# Patient Record
Sex: FEMALE | Race: WHITE | Employment: UNEMPLOYED
[De-identification: names, ages, dates, MRNs, and addresses within clinical notes are randomized per-mention and may not be internally consistent; named-entity substitution may affect disease eponyms.]

---

## 2023-04-13 ENCOUNTER — HOSPITAL ENCOUNTER (OUTPATIENT)
Facility: HOSPITAL | Age: 33
Setting detail: RECURRING SERIES
Discharge: HOME | End: 2023-04-16
Payer: OTHER GOVERNMENT

## 2023-04-13 PROCEDURE — 97161 PT EVAL LOW COMPLEX 20 MIN: CPT

## 2023-04-13 PROCEDURE — 97535 SELF CARE MNGMENT TRAINING: CPT

## 2023-04-13 PROCEDURE — 97110 THERAPEUTIC EXERCISES: CPT

## 2023-05-12 ENCOUNTER — TELEPHONE (OUTPATIENT)
Facility: HOSPITAL | Age: 33
End: 2023-05-12

## 2023-11-17 ENCOUNTER — HOSPITAL ENCOUNTER (OUTPATIENT)
Facility: HOSPITAL | Age: 33
Setting detail: RECURRING SERIES
Discharge: HOME | End: 2023-11-20
Payer: OTHER GOVERNMENT

## 2023-11-17 PROCEDURE — 97535 SELF CARE MNGMENT TRAINING: CPT

## 2023-11-17 PROCEDURE — 97112 NEUROMUSCULAR REEDUCATION: CPT

## 2023-11-17 PROCEDURE — 97110 THERAPEUTIC EXERCISES: CPT

## 2023-11-17 PROCEDURE — 97162 PT EVAL MOD COMPLEX 30 MIN: CPT

## 2023-11-17 NOTE — PROGRESS NOTES
evidenced by a 5 point  improvement in FOTO score. Eval: FOTO 58  FOTO score=established functional score where 100=no disability     Pt demonstrates independence with management tools & exercise program that are beneficial for current condition in order to feel comfortable with Pelvic floor PT D/C & not fear. Eval: pt unaware of what activities to do reduce her symptoms and to avoid exacerbation of current condition     Frequency / Duration: Patient to be seen 1 times per week for 12 treatments    Patient/ Caregiver education and instruction: Diagnosis, prognosis, self care, activity modification, and exercises     [x]  Plan of care has been reviewed with PTA    Certification Period: francisco Monroy, PT 11/17/2023 11:54 AM    ________________________________________________________________________    I certify that the above Therapy Services are being furnished while the patient is under my care. I agree with the treatment plan and certify that this therapy is necessary.     Physician's Signature:____________________  Date:____________Time:____________                                      Kasandra Houston      Please sign and return to [facility] [place] [address] [address], [address] [place] [address]  Ph [phone]  Fx [phone]
analyze and modify body mechanics/ergonomics, and assess and modify postural abnormalities to attain remaining goals. [x]  See Plan of Care  []  See progress note/recertification  []  See Discharge Summary         Progress towards goals / Updated goals:  Short term goals: To be achieved in 6 treatments[de-identified]  Pt demonstrates proper dietary/fluid habits & urge suppression strategies that promote bladder & bowel health to aid in management of urinary urgency & incontinence. Eval: Pt consuming slightly insufficient amount of water & unaware of bladder fitness, dietary irritants & urge suppression strategies. Pt reports improvement in UI complaints evidenced by decrease in frequency &/or amount of leakage by 50% to improve QOL. Eval: Pt reports 2-3 episodes each week, generally drop or two to damp (amt of leakage). Pt will report voiding interval of 3 hours to improve QOL  Eval:   2 hours    Pt will report bowel movements as  3-4 on the [facility] Stool Scale to improve QOL  Eval: 1-3    Pt will report pain/pressure with intercourse at worst to be 5/10 to improve relations with her   Eval: 9/10 pressure especially towards the rectum    Pt will report lower abdominal pain at worst to be 5/10 to improve QOL  Eval: 9/10    Pt will be able to maintain [facility] for 8 sec, 8 reps with no accessory substitution or breath holding to improve strength to decrease reported symptoms. Eval: PFMC 3 sec, 7 reps with gluteal substitutions & breath holding    Long term goals: To be achieved in 12 treatments[de-identified]  Pt reports bladder continence 75% of the time with cough/sneeze/laugh   Eval: Pt reports 2-3 episodes each week, generally drop or two to damp (amt of leakage). Pt demonstrates [facility] 4/5 & ability to maintain contraction for 10 sec, 10 reps to improve strength to decrease reported symptoms.    Eval: PFM 4/5, 3 sec hold, 7 reps, with gluteal substitution    Pt will report bowel movements as  4 on the [facility] Stool

## 2023-11-30 ENCOUNTER — TELEPHONE (OUTPATIENT)
Facility: HOSPITAL | Age: 33
End: 2023-11-30

## 2023-11-30 ENCOUNTER — HOSPITAL ENCOUNTER (OUTPATIENT)
Facility: HOSPITAL | Age: 33
Setting detail: RECURRING SERIES
End: 2023-11-30
Payer: OTHER GOVERNMENT

## 2023-11-30 PROCEDURE — 97530 THERAPEUTIC ACTIVITIES: CPT

## 2023-11-30 PROCEDURE — 97112 NEUROMUSCULAR REEDUCATION: CPT

## 2023-11-30 PROCEDURE — 97535 SELF CARE MNGMENT TRAINING: CPT

## 2023-11-30 NOTE — PROGRESS NOTES
PHYSICAL / OCCUPATIONAL THERAPY - DAILY TREATMENT NOTE (updated )    Patient Name: Randy Lujan    Date: 2023    : 1990  Insurance: Payor:  EAST / Plan: [employer] EAST / Product Type: *No Product type* /      Patient  verified YES   Visit #   Current / Total 2 12   Time   In / Out 4:18 5:10   Pain   In / Out 3 2   Subjective Functional Status/Changes: Patient reports adherence to stretching HEP. Has been asking herself if she needs to go to help with urgency. Continues to have leakage and today didn't feel it. Changes to:  Meds, Allergies, Med Hx, Sx Hx? If yes, update Summary List YES/NO     TREATMENT AREA =  Other symptoms and signs involving the genitourinary system [R39.89]    OBJECTIVE           Therapeutic Procedures: Tx Min Billable or 1:1 Min (if diff from Tx Min) Procedure, Rationale, Specifics   28  [id number] Neuromuscular Re-Education (timed):  improve balance, coordination, kinesthetic sense, posture, core stability and proprioception to improve patient's ability to develop conscious control of individual muscles and awareness of position of extremities in order to progress to PLOF and address remaining functional goals. (see flow sheet as applicable)     Details if applicable:    []  Pelvic floor strengthening                 []  Pelvic floor downtraining  []  Quality pelvic floor contractions       []  Relaxation techniques  []  Urge suppression exercises  [x]  Other: abdominal massage, propre diaphragmatic breathing, PFM stretching   12  88455 Therapeutic Activity (timed):  use of dynamic activities replicating functional movements to increase ROM, strength, coordination, balance, and proprioception in order to improve patient's ability to progress to PLOF and address remaining functional goals.   (see flow sheet as applicable)     Details if applicable:    []  Pelvic floor strengthening                 []  Pelvic floor downtraining  []  Quality pelvic floor contractions

## 2023-12-07 ENCOUNTER — HOSPITAL ENCOUNTER (OUTPATIENT)
Facility: HOSPITAL | Age: 33
Setting detail: RECURRING SERIES
Discharge: HOME | End: 2023-12-10
Payer: OTHER GOVERNMENT

## 2023-12-07 PROCEDURE — 97535 SELF CARE MNGMENT TRAINING: CPT

## 2023-12-07 PROCEDURE — 97112 NEUROMUSCULAR REEDUCATION: CPT

## 2023-12-07 PROCEDURE — 97110 THERAPEUTIC EXERCISES: CPT

## 2023-12-07 NOTE — PROGRESS NOTES
PHYSICAL / OCCUPATIONAL THERAPY - DAILY TREATMENT NOTE (updated )    Patient Name: Marcelino Golden    Date: 2023    : 1990  Insurance: Payor:  EAST / Plan:  EAST / Product Type: *No Product type* /      Patient  verified Yes     Visit #   Current / Total 3 12   Time   In / Out 752 840   Pain   In / Out 3 3   Subjective Functional Status/Changes: Pt reports using urge suppression, ILU massage   Changes to: Allergies, Med Hx, Sx Hx?   no       TREATMENT AREA =  Other symptoms and signs involving the genitourinary system [R39.89]    OBJECTIVE          Therapeutic Procedures: Tx Min Billable or 1:1 Min (if diff from Tx Min) Procedure, Rationale, Specifics   32 32 [id number] Neuromuscular Re-Education (timed):  improve balance, coordination, kinesthetic sense, posture, core stability and proprioception to improve patient's ability to develop conscious control of individual muscles and awareness of position of extremities in order to progress to PLOF and address remaining functional goals. (see flow sheet as applicable)    Details if applicable:       8  36526 Therapeutic Exercise (timed):  increase ROM, strength, coordination, balance, and proprioception to improve patient's ability to progress to PLOF and address remaining functional goals. (see flow sheet as applicable)    Details if applicable:     8  20289 Self Care/Home Management (timed):  improve patient knowledge and understanding of pain reducing techniques and activity modification  to improve patient's ability to progress to PLOF and address remaining functional goals.   (see flow sheet as applicable)     Details if applicable:           Details if applicable:            Details if applicable:     48 50 [address] Reminder: bill using total billable min of TIMED therapeutic procedures (example: do not include dry needle or estim unattended, both untimed codes, in totals to left)  8-22 min = 1 unit; 23-37 min = 2 units; 38-52

## 2023-12-13 ENCOUNTER — TELEPHONE (OUTPATIENT)
Facility: HOSPITAL | Age: 33
End: 2023-12-13

## 2023-12-13 ENCOUNTER — HOSPITAL ENCOUNTER (OUTPATIENT)
Facility: HOSPITAL | Age: 33
Setting detail: RECURRING SERIES
End: 2023-12-13
Payer: OTHER GOVERNMENT

## 2023-12-13 NOTE — TELEPHONE ENCOUNTER
Pt called 12/12 to cxl f/u for 12/13 and 12/22 b/c child is sick. Call was not recorded and appts not cxl'd. Pt called back 12/13 to clarify.  Missed appt on 12/13 will NOT be counted as no-show

## 2023-12-14 ENCOUNTER — APPOINTMENT (OUTPATIENT)
Facility: HOSPITAL | Age: 33
End: 2023-12-14
Payer: OTHER GOVERNMENT

## 2023-12-21 ENCOUNTER — APPOINTMENT (OUTPATIENT)
Facility: HOSPITAL | Age: 33
End: 2023-12-21
Payer: OTHER GOVERNMENT

## 2023-12-22 ENCOUNTER — APPOINTMENT (OUTPATIENT)
Facility: HOSPITAL | Age: 33
End: 2023-12-22
Payer: OTHER GOVERNMENT

## 2023-12-28 ENCOUNTER — HOSPITAL ENCOUNTER (OUTPATIENT)
Facility: HOSPITAL | Age: 33
Setting detail: RECURRING SERIES
Discharge: HOME | End: 2023-12-31
Payer: OTHER GOVERNMENT

## 2023-12-28 PROCEDURE — 97110 THERAPEUTIC EXERCISES: CPT

## 2023-12-28 PROCEDURE — 97112 NEUROMUSCULAR REEDUCATION: CPT

## 2023-12-28 PROCEDURE — 97535 SELF CARE MNGMENT TRAINING: CPT

## 2023-12-28 NOTE — PROGRESS NOTES
leakage by 50% to improve QOL.  Eval: Pt reports 2-3 episodes each week, generally drop or two to damp (amt of leakage).               Current: 11/30/23: reports increased leakage today that she was unaware of- regression   12/28: patient reports a few drops a day - regression     Pt will report voiding interval of 3 hours to improve QOL  Eval:   2 hours     Pt will report bowel movements as  3-4 on the [place] Stool Scale to improve QOL  Eval: 1-3  Current:  1-4  on the bristol scale.  She used to have type 6 5x/day.  12/7/2023 Progressing     Pt will report pain/pressure with intercourse at worst to be 5/10 to improve relations with her   Eval: 9/10 pressure especially towards the rectum     Pt will report lower abdominal pain at worst to be 5/10 to improve QOL  Eval: 9/10  12/28: 6/10 at worse PROGRESSING     Pt will be able to maintain PFMC for 8 sec, 8 reps with no accessory substitution or breath holding to improve strength to decrease reported symptoms.  Eval: PFMC 3 sec, 7 reps with gluteal substitutions & breath holding     Long term goals: To be achieved in 12 treatments::  Pt reports bladder continence 75% of the time with cough/sneeze/laugh   Eval: Pt reports 2-3 episodes each week, generally drop or two to damp (amt of leakage).      Pt demonstrates PFMC 4/5 & ability to maintain contraction for 10 sec, 10 reps to improve strength to decrease reported symptoms.   Eval: PFM 4/5, 3 sec hold, 7 reps, with gluteal substitution     Pt will report bowel movements as  4 on the [place] Stool Scale to improve QOL  Eval: 1-3     Pt will report pain/pressure with intercourse at worst to be 2/10 to improve relations with her   Eval: 9/10 pressure especially towards the rectum     Pt will report lower abdominal pain at worst to be 2/10 to improve QOL and allow her to lie flat in supine  Eval: 9/10 especially while lying flat in supine     Pt will decrease SHUBHAM by  1    fingers width at umbilicus and 1\"

## 2024-01-04 ENCOUNTER — HOSPITAL ENCOUNTER (OUTPATIENT)
Facility: HOSPITAL | Age: 34
Setting detail: RECURRING SERIES
Discharge: HOME | End: 2024-01-07
Payer: OTHER GOVERNMENT

## 2024-01-04 PROCEDURE — 97140 MANUAL THERAPY 1/> REGIONS: CPT

## 2024-01-04 PROCEDURE — 97112 NEUROMUSCULAR REEDUCATION: CPT

## 2024-01-04 PROCEDURE — 97110 THERAPEUTIC EXERCISES: CPT

## 2024-01-04 NOTE — PROGRESS NOTES
PHYSICAL / OCCUPATIONAL THERAPY - DAILY TREATMENT NOTE (updated )    Patient Name: Ana Patrick    Date: 2024    : 1990  Insurance: Payor: [employer] EAST / Plan: [employer] EAST / Product Type: *No Product type* /      Patient  verified Yes     Visit #   Current / Total 5 12   Time   In / Out 752 835   Pain   In / Out 4 3   Subjective Functional Status/Changes: Pt reports feeling increased cramping this week.  Pt reported increased discomfort with TA activation with march   Changes to:  Allergies, Med Hx, Sx Hx?   no       TREATMENT AREA =  Other symptoms and signs involving the genitourinary system [R39.89]    OBJECTIVE          Therapeutic Procedures:  Tx Min Billable or 1:1 Min (if diff from Tx Min) Procedure, Rationale, Specifics   24  27402 Therapeutic Exercise (timed):  increase ROM, strength, coordination, balance, and proprioception to improve patient's ability to progress to PLOF and address remaining functional goals. (see flow sheet as applicable)    Details if applicable:        20475 Manual Therapy (timed):  decrease pain, increase ROM, and increase tissue extensibility to improve patient's ability to progress to PLOF and address remaining functional goals.  The manual therapy interventions were performed at a separate and distinct time from the therapeutic activities interventions . Details:  scar massage, MFR abdomen with fascial manipulation of bladder/uterus    Details if applicable:      83022 Neuromuscular Re-Education (timed):  improve balance, coordination, kinesthetic sense, posture, core stability and proprioception to improve patient's ability to develop conscious control of individual muscles and awareness of position of extremities in order to progress to PLOF and address remaining functional goals. (see flow sheet as applicable)     Details if applicable:           Details if applicable:            Details if applicable:     43 43  BC Totals Reminder:

## 2024-01-10 ENCOUNTER — HOSPITAL ENCOUNTER (OUTPATIENT)
Facility: HOSPITAL | Age: 34
Setting detail: RECURRING SERIES
Discharge: HOME | End: 2024-01-13
Payer: OTHER GOVERNMENT

## 2024-01-10 PROCEDURE — 97112 NEUROMUSCULAR REEDUCATION: CPT

## 2024-01-10 PROCEDURE — 97110 THERAPEUTIC EXERCISES: CPT

## 2024-01-10 PROCEDURE — 97530 THERAPEUTIC ACTIVITIES: CPT

## 2024-01-10 NOTE — PROGRESS NOTES
PHYSICAL / OCCUPATIONAL THERAPY - DAILY TREATMENT NOTE     Patient Name: Ana Patrick    Date: 1/10/2024    : 1990  Insurance: Payor:  EAST / Plan:  EAST / Product Type: *No Product type* /      Patient  verified Yes     Visit #   Current / Total 6 12   Time   In / Out 512 550   Pain   In / Out 3 3   Subjective Functional Status/Changes: Pt reports less pain   Changes to:  Allergies, Med Hx, Sx Hx?   no       TREATMENT AREA =  Other symptoms and signs involving the genitourinary system [R39.89]    OBJECTIVE          Therapeutic Procedures:  Tx Min Billable or 1:1 Min (if diff from Tx Min) Procedure, Rationale, Specifics    53425 Therapeutic Exercise (timed):  increase ROM, strength, coordination, balance, and proprioception to improve patient's ability to progress to PLOF and address remaining functional goals. (see flow sheet as applicable)    Details if applicable:       8  41137 Neuromuscular Re-Education (timed):  improve balance, coordination, kinesthetic sense, posture, core stability and proprioception to improve patient's ability to develop conscious control of individual muscles and awareness of position of extremities in order to progress to PLOF and address remaining functional goals. (see flow sheet as applicable)    Details if applicable:      69994 Therapeutic Activity (timed):  use of dynamic activities replicating functional movements to increase ROM, strength, coordination, balance, and proprioception in order to improve patient's ability to progress to PLOF and address remaining functional goals.  (see flow sheet as applicable)     Details if applicable:           Details if applicable:            Details if applicable:     38 38 [facility] Totals Reminder: bill using total billable min of TIMED therapeutic procedures (example: do not include dry needle or estim unattended, both untimed codes, in totals to left)  8-22 min = 1 unit; 23-37 min = 2 units; 38-52 min = 3

## 2024-01-10 NOTE — PROGRESS NOTES
[facility]  [address] [place] [address]  Ph [phone]  Fx [phone]    Physical Therapy Progress Note  Patient name: Ana Patrick Start of Care: 2023   Referral source: Grisel Nur : 1990               Medical Diagnosis: Other symptoms and signs involving the genitourinary system [R39.89]    Onset Date:2009               Treatment Diagnosis: Other symptoms and signs involving the genitourinary system [R39.89]   Prior Hospitalization: see medical history Provider#: [id number]   Medications: Verified on Patient summary List    Comorbidities:  PCOS, 1 pregnancy/ , fatty liver, intercranial hypertension,  UTI . IBS   Prior Level of Function: active lifestyle, no pain, no leakage    Visits from Start of Care: 6    Missed Visits: 0    Updated Goals/Measure of Progress: To be achieved in 6 treatments:    Short term goals: To be achieved in 6 treatments::  Pt demonstrates proper dietary/fluid habits & urge suppression strategies that promote bladder & bowel health to aid in management of urinary urgency & incontinence.  Eval: Pt consuming slightly insufficient amount of water & unaware of bladder fitness, dietary irritants & urge suppression strategies.               Current: 23: patient reports trying to wait when she has urge  PN: pt reports drinking about 100 ounces. Pt reports using urge suppression GOAL MET 1/10/2024     Pt reports improvement in UI complaints evidenced by decrease in frequency &/or amount of leakage by 50% to improve QOL.  Eval: Pt reports 2-3 episodes each week, generally drop or two to damp (amt of leakage).               Current: 23: reports increased leakage today that she was unaware of- regression              : patient reports a few drops a day - regression  PN: At least 1x/day.  A few drops   No Change  1/10/2024     Pt will report voiding interval of 3 hours to improve QOL  Eval:   2 hours  Current:

## 2024-01-11 ENCOUNTER — APPOINTMENT (OUTPATIENT)
Facility: HOSPITAL | Age: 34
End: 2024-01-11
Payer: OTHER GOVERNMENT

## 2024-01-18 ENCOUNTER — TELEPHONE (OUTPATIENT)
Facility: HOSPITAL | Age: 34
End: 2024-01-18

## 2024-01-25 ENCOUNTER — HOSPITAL ENCOUNTER (OUTPATIENT)
Facility: HOSPITAL | Age: 34
Setting detail: RECURRING SERIES
Discharge: HOME | End: 2024-01-28
Payer: OTHER GOVERNMENT

## 2024-01-25 PROCEDURE — 97530 THERAPEUTIC ACTIVITIES: CPT

## 2024-01-25 PROCEDURE — 97112 NEUROMUSCULAR REEDUCATION: CPT

## 2024-01-25 PROCEDURE — 97110 THERAPEUTIC EXERCISES: CPT

## 2024-01-25 NOTE — PROGRESS NOTES
dry needle or estim unattended, both untimed codes, in totals to left)  8-22 min = 1 unit; 23-37 min = 2 units; 38-52 min = 3 units; 53-67 min = 4 units; 68-82 min = 5 units   Total Total     TOTAL TREATMENT TIME:        40     [x]  Patient Education billed concurrently with other procedures   [x] Review HEP    [] Progressed/Changed HEP, detail:    [] Other detail:       Objective Information/Functional Measures/Assessment    Patient tolerated treatment session well today. Patient had no complaints with addition of transversus abdominis activation on foam roller march/leg extension/one arm fly  to exercise program to accomplish increased pelvic floor activation.   Patient continues to make steady progress toward goals and would benefit from continued skilled PT intervention to address remaining deficits outlined in goals below.     Patient will continue to benefit from skilled PT services to modify and progress therapeutic interventions, analyze and address functional mobility deficits, analyze and address ROM deficits, analyze and address strength deficits, analyze and address soft tissue restrictions, analyze and cue for proper movement patterns, and analyze and modify for postural abnormalities to address functional deficits and attain remaining goals.    Progress toward goals / Updated goals:  []  See Progress Note/Recertification    Short term goals: To be achieved in 6 treatments::  Pt demonstrates proper dietary/fluid habits & urge suppression strategies that promote bladder & bowel health to aid in management of urinary urgency & incontinence.  Eval: Pt consuming slightly insufficient amount of water & unaware of bladder fitness, dietary irritants & urge suppression strategies.               Current: 11/30/23: patient reports trying to wait when she has urge  PN: pt reports drinking about 100 ounces. Pt reports using urge suppression GOAL MET 1/10/2024     Pt reports improvement in UI complaints evidenced by

## 2024-02-01 ENCOUNTER — HOSPITAL ENCOUNTER (OUTPATIENT)
Facility: HOSPITAL | Age: 34
Setting detail: RECURRING SERIES
Discharge: HOME | End: 2024-02-04
Payer: OTHER GOVERNMENT

## 2024-02-01 PROCEDURE — 97112 NEUROMUSCULAR REEDUCATION: CPT

## 2024-02-01 PROCEDURE — 97140 MANUAL THERAPY 1/> REGIONS: CPT

## 2024-02-01 PROCEDURE — 97110 THERAPEUTIC EXERCISES: CPT

## 2024-02-01 PROCEDURE — 97530 THERAPEUTIC ACTIVITIES: CPT

## 2024-02-01 NOTE — PROGRESS NOTES
slightly insufficient amount of water & unaware of bladder fitness, dietary irritants & urge suppression strategies.               Current: 11/30/23: patient reports trying to wait when she has urge  PN: pt reports drinking about 100 ounces. Pt reports using urge suppression GOAL MET 1/10/2024     Pt reports improvement in UI complaints evidenced by decrease in frequency &/or amount of leakage by 50% to improve QOL.  Eval: Pt reports 2-3 episodes each week, generally drop or two to damp (amt of leakage).               Current: 11/30/23: reports increased leakage today that she was unaware of- regression              12/28: patient reports a few drops a day - regression  PN: At least 1x/day.  A few drops   No Change  1/10/2024     Pt will report voiding interval of 3 hours to improve QOL  Eval:   2 hours  Current:  2-3 hours Progressing 1/4/24  PN: 2-3 hours No change 1/10/2024     Pt will report bowel movements as  3-4 on the [place] Stool Scale to improve QOL  Eval: 1-3  Current:  1-4  on the bristol scale.  She used to have type 6 5x/day.  12/7/2023 Progressing  PN:fluctuates between 4-5 GOAL MET 1/10/2024     Pt will report pain/pressure with intercourse at worst to be 5/10 to improve relations with her   Eval: 9/10 pressure especially towards the rectum  PN: 4/10 pressure towards her rectum  GOAL MET 1/10/2024        Pt will report lower abdominal pain at worst to be 5/10 to improve QOL  Eval: 9/10  12/28: 6/10 at worse PROGRESSING  PN: 6/10 at worst No change 1/10/2024  Current:  pt reports having had one episode of pain that was a 9/10 but short.  2/1/2024 Regression     Pt will be able to maintain PFMC for 8 sec, 8 reps with no accessory substitution or breath holding to improve strength to decrease reported symptoms.  Eval: PFMC 3 sec, 7 reps with gluteal substitutions & breath holding  PN: Pt defers 1/10/2024     Long term goals: To be achieved in 12 treatments::  Pt reports bladder continence 75% of

## 2024-02-08 ENCOUNTER — HOSPITAL ENCOUNTER (OUTPATIENT)
Facility: HOSPITAL | Age: 34
Setting detail: RECURRING SERIES
Discharge: HOME | End: 2024-02-11
Payer: OTHER GOVERNMENT

## 2024-02-08 PROCEDURE — 97112 NEUROMUSCULAR REEDUCATION: CPT

## 2024-02-08 PROCEDURE — 97110 THERAPEUTIC EXERCISES: CPT

## 2024-02-08 PROCEDURE — 97530 THERAPEUTIC ACTIVITIES: CPT

## 2024-02-08 PROCEDURE — 97140 MANUAL THERAPY 1/> REGIONS: CPT

## 2024-02-08 NOTE — PROGRESS NOTES
PHYSICAL / OCCUPATIONAL THERAPY - DAILY TREATMENT NOTE     Patient Name: Ana Patrick    Date: 2024    : 1990  Insurance: Payor:  EAST / Plan:  EAST / Product Type: *No Product type* /      Patient  verified Yes     Visit #   Current / Total 9 12   Time   In / Out 754 834   Pain   In / Out 3 2   Subjective Functional Status/Changes: Pt reports less UI   Changes to:  Allergies, Med Hx, Sx Hx?   no       TREATMENT AREA =  Other symptoms and signs involving the genitourinary system [R39.89]    OBJECTIVE          Therapeutic Procedures:  Tx Min Billable or 1:1 Min (if diff from Tx Min) Procedure, Rationale, Specifics   24  34384 Therapeutic Exercise (timed):  increase ROM, strength, coordination, balance, and proprioception to improve patient's ability to progress to PLOF and address remaining functional goals. (see flow sheet as applicable)    Details if applicable:             08983 Therapeutic Activity (timed):  use of dynamic activities replicating functional movements to increase ROM, strength, coordination, balance, and proprioception in order to improve patient's ability to progress to PLOF and address remaining functional goals.  (see flow sheet as applicable)     Details if applicable:      06344 Manual Therapy (timed):  decrease pain, increase ROM, and increase tissue extensibility to improve patient's ability to progress to PLOF and address remaining functional goals.  The manual therapy interventions were performed at a separate and distinct time from the therapeutic activities interventions . Details: abdominal MFR cupping left     Details if applicable:            Details if applicable:     40 40 [facility] BC Totals Reminder: bill using total billable min of TIMED therapeutic procedures (example: do not include dry needle or estim unattended, both untimed codes, in totals to left)  8-22 min = 1 unit; 23-37 min = 2 units; 38-52 min = 3 units; 53-67 min = 4 units; 68-82 min =

## 2024-02-08 NOTE — PROGRESS NOTES
[facility]  [address] [place] [address]  Ph [phone]  Fx [phone]    Physical Therapy Progress Note  Patient name: Ana Patrick Start of Care: 2023   Referral source: Grisel Nur : 1990               Medical Diagnosis: Other symptoms and signs involving the genitourinary system [R39.89]    Onset Date:2009               Treatment Diagnosis: Other symptoms and signs involving the genitourinary system [R39.89]   Prior Hospitalization: see medical history Provider#: [id number]   Medications: Verified on Patient summary List    Comorbidities:  PCOS, 1 pregnancy/ , fatty liver, intercranial hypertension,  UTI . IBS   Prior Level of Function: active lifestyle, no pain, no leakage    Visits from Start of Care: 9    Missed Visits: 1    Updated Goals/Measure of Progress: To be achieved in 3 treatments:    Short term goals: To be achieved in 6 treatments::  Pt demonstrates proper dietary/fluid habits & urge suppression strategies that promote bladder & bowel health to aid in management of urinary urgency & incontinence.  Eval: Pt consuming slightly insufficient amount of water & unaware of bladder fitness, dietary irritants & urge suppression strategies.               Current: 23: patient reports trying to wait when she has urge  PN: pt reports drinking about 100 ounces. Pt reports using urge suppression GOAL MET 1/10/2024     Pt reports improvement in UI complaints evidenced by decrease in frequency &/or amount of leakage by 50% to improve QOL.  Eval: Pt reports 2-3 episodes each week, generally drop or two to damp (amt of leakage).               Current: 23: reports increased leakage today that she was unaware of- regression              : patient reports a few drops a day - regression  PN: At least 1x/day.  A few drops   No Change  1/10/2024  PN:  1 episode this past week.  Pt reports a 30% improvement.  Progressing 2024

## 2024-02-20 ENCOUNTER — APPOINTMENT (OUTPATIENT)
Facility: HOSPITAL | Age: 34
End: 2024-02-20
Payer: OTHER GOVERNMENT

## 2024-02-20 ENCOUNTER — TELEPHONE (OUTPATIENT)
Facility: HOSPITAL | Age: 34
End: 2024-02-20

## 2024-02-20 NOTE — TELEPHONE ENCOUNTER
Pt called to cxl appt due to not feeling well. Put pt on cxl wait list for this week either first thing in the morning or late afternoon.

## 2024-02-27 ENCOUNTER — TELEPHONE (OUTPATIENT)
Facility: HOSPITAL | Age: 34
End: 2024-02-27

## 2024-03-05 ENCOUNTER — TELEPHONE (OUTPATIENT)
Facility: HOSPITAL | Age: 34
End: 2024-03-05

## 2024-03-05 NOTE — TELEPHONE ENCOUNTER
Per , pt called saying she would be late for appt. Twenty minutes after appt time, pt called and stated she can't come and needs to R/S. I therapist, spoke, with pt today on the phone. We discussed discharge policy and consistency going forward. At this time, pt wishes to discharge due to busy schedule.